# Patient Record
Sex: FEMALE | Race: WHITE | NOT HISPANIC OR LATINO | URBAN - NONMETROPOLITAN AREA
[De-identification: names, ages, dates, MRNs, and addresses within clinical notes are randomized per-mention and may not be internally consistent; named-entity substitution may affect disease eponyms.]

---

## 2023-05-01 ENCOUNTER — OFFICE (OUTPATIENT)
Dept: URBAN - NONMETROPOLITAN AREA CLINIC 1 | Facility: CLINIC | Age: 43
End: 2023-05-01

## 2023-05-01 VITALS
HEART RATE: 81 BPM | DIASTOLIC BLOOD PRESSURE: 81 MMHG | WEIGHT: 270 LBS | HEIGHT: 69 IN | SYSTOLIC BLOOD PRESSURE: 139 MMHG | SYSTOLIC BLOOD PRESSURE: 145 MMHG | DIASTOLIC BLOOD PRESSURE: 111 MMHG

## 2023-05-01 DIAGNOSIS — K31.84 GASTROPARESIS: ICD-10-CM

## 2023-05-01 DIAGNOSIS — R19.5 OTHER FECAL ABNORMALITIES: ICD-10-CM

## 2023-05-01 DIAGNOSIS — R10.11 RIGHT UPPER QUADRANT PAIN: ICD-10-CM

## 2023-05-01 DIAGNOSIS — K62.5 HEMORRHAGE OF ANUS AND RECTUM: ICD-10-CM

## 2023-05-01 DIAGNOSIS — K64.9 UNSPECIFIED HEMORRHOIDS: ICD-10-CM

## 2023-05-01 PROCEDURE — 99214 OFFICE O/P EST MOD 30 MIN: CPT | Performed by: NURSE PRACTITIONER

## 2023-05-01 RX ORDER — HYDROCORTISONE ACETATE 25 MG/1
50 SUPPOSITORY RECTAL
Qty: 30 | Refills: 1 | Status: ACTIVE
Start: 2023-05-01

## 2023-05-02 LAB
ANEMIA PROFILE A: BASO (ABSOLUTE): 0 X10E3/UL (ref 0–0.2)
ANEMIA PROFILE A: BASOS: 0 %
ANEMIA PROFILE A: EOS (ABSOLUTE): 0.2 X10E3/UL (ref 0–0.4)
ANEMIA PROFILE A: EOS: 2 %
ANEMIA PROFILE A: HEMATOCRIT: 39.7 % (ref 34–46.6)
ANEMIA PROFILE A: HEMATOLOGY COMMENTS: (no result)
ANEMIA PROFILE A: HEMOGLOBIN: 13.2 G/DL (ref 11.1–15.9)
ANEMIA PROFILE A: IMMATURE CELLS: (no result)
ANEMIA PROFILE A: IMMATURE GRANS (ABS): 0 X10E3/UL (ref 0–0.1)
ANEMIA PROFILE A: IMMATURE GRANULOCYTES: 0 %
ANEMIA PROFILE A: IRON BIND.CAP.(TIBC): 340 UG/DL (ref 250–450)
ANEMIA PROFILE A: IRON SATURATION: 9 % — CRITICAL LOW (ref 15–55)
ANEMIA PROFILE A: IRON: 31 UG/DL (ref 27–159)
ANEMIA PROFILE A: LYMPHS (ABSOLUTE): 3.2 X10E3/UL — HIGH (ref 0.7–3.1)
ANEMIA PROFILE A: LYMPHS: 27 %
ANEMIA PROFILE A: MCH: 28.1 PG (ref 26.6–33)
ANEMIA PROFILE A: MCHC: 33.2 G/DL (ref 31.5–35.7)
ANEMIA PROFILE A: MCV: 85 FL (ref 79–97)
ANEMIA PROFILE A: MONOCYTES(ABSOLUTE): 0.7 X10E3/UL (ref 0.1–0.9)
ANEMIA PROFILE A: MONOCYTES: 6 %
ANEMIA PROFILE A: NEUTROPHILS (ABSOLUTE): 7.8 X10E3/UL — HIGH (ref 1.4–7)
ANEMIA PROFILE A: NEUTROPHILS: 65 %
ANEMIA PROFILE A: NRBC: (no result)
ANEMIA PROFILE A: PLATELETS: 296 X10E3/UL (ref 150–450)
ANEMIA PROFILE A: RBC: 4.69 X10E6/UL (ref 3.77–5.28)
ANEMIA PROFILE A: RDW: 13.1 % (ref 11.7–15.4)
ANEMIA PROFILE A: RETICULOCYTE COUNT: 2.5 % (ref 0.6–2.6)
ANEMIA PROFILE A: UIBC: 309 UG/DL (ref 131–425)
ANEMIA PROFILE A: WBC: 12 X10E3/UL — HIGH (ref 3.4–10.8)
C-REACTIVE PROTEIN, QUANT: 8 MG/L (ref 0–10)
COMP. METABOLIC PANEL (14): A/G RATIO: 2 (ref 1.2–2.2)
COMP. METABOLIC PANEL (14): ALBUMIN: 4.3 G/DL (ref 3.8–4.8)
COMP. METABOLIC PANEL (14): ALKALINE PHOSPHATASE: 85 IU/L (ref 44–121)
COMP. METABOLIC PANEL (14): ALT (SGPT): 17 IU/L (ref 0–32)
COMP. METABOLIC PANEL (14): AST (SGOT): 16 IU/L (ref 0–40)
COMP. METABOLIC PANEL (14): BILIRUBIN, TOTAL: 0.3 MG/DL (ref 0–1.2)
COMP. METABOLIC PANEL (14): BUN/CREATININE RATIO: 14 (ref 9–23)
COMP. METABOLIC PANEL (14): BUN: 9 MG/DL (ref 6–24)
COMP. METABOLIC PANEL (14): CALCIUM: 9.6 MG/DL (ref 8.7–10.2)
COMP. METABOLIC PANEL (14): CARBON DIOXIDE, TOTAL: 21 MMOL/L (ref 20–29)
COMP. METABOLIC PANEL (14): CHLORIDE: 104 MMOL/L (ref 96–106)
COMP. METABOLIC PANEL (14): CREATININE: 0.65 MG/DL (ref 0.57–1)
COMP. METABOLIC PANEL (14): EGFR: 111 ML/MIN/1.73 (ref 59–?)
COMP. METABOLIC PANEL (14): GLOBULIN, TOTAL: 2.1 G/DL (ref 1.5–4.5)
COMP. METABOLIC PANEL (14): GLUCOSE: 94 MG/DL (ref 70–99)
COMP. METABOLIC PANEL (14): POTASSIUM: 4 MMOL/L (ref 3.5–5.2)
COMP. METABOLIC PANEL (14): PROTEIN, TOTAL: 6.4 G/DL (ref 6–8.5)
COMP. METABOLIC PANEL (14): SODIUM: 140 MMOL/L (ref 134–144)
FERRITIN: 32 NG/ML (ref 15–150)

## 2023-07-05 ENCOUNTER — ON CAMPUS - OUTPATIENT (OUTPATIENT)
Dept: URBAN - NONMETROPOLITAN AREA HOSPITAL 34 | Facility: HOSPITAL | Age: 43
End: 2023-07-05

## 2023-07-05 DIAGNOSIS — D12.5 BENIGN NEOPLASM OF SIGMOID COLON: ICD-10-CM

## 2023-07-05 PROCEDURE — 45385 COLONOSCOPY W/LESION REMOVAL: CPT | Performed by: INTERNAL MEDICINE

## 2024-07-08 ENCOUNTER — OFFICE (OUTPATIENT)
Dept: URBAN - NONMETROPOLITAN AREA CLINIC 1 | Facility: CLINIC | Age: 44
End: 2024-07-08

## 2024-07-08 VITALS
DIASTOLIC BLOOD PRESSURE: 92 MMHG | SYSTOLIC BLOOD PRESSURE: 130 MMHG | WEIGHT: 225 LBS | HEIGHT: 69 IN | HEART RATE: 86 BPM

## 2024-07-08 DIAGNOSIS — K64.9 UNSPECIFIED HEMORRHOIDS: ICD-10-CM

## 2024-07-08 DIAGNOSIS — K59.00 CONSTIPATION, UNSPECIFIED: ICD-10-CM

## 2024-07-08 DIAGNOSIS — R11.0 NAUSEA: ICD-10-CM

## 2024-07-08 DIAGNOSIS — K31.84 GASTROPARESIS: ICD-10-CM

## 2024-07-08 PROCEDURE — 99213 OFFICE O/P EST LOW 20 MIN: CPT | Performed by: NURSE PRACTITIONER

## 2024-07-08 RX ORDER — METOCLOPRAMIDE 10 MG/1
20 TABLET ORAL
Qty: 60 | Refills: 2 | Status: ACTIVE
Start: 2024-07-08

## 2024-07-08 RX ORDER — ONDANSETRON HYDROCHLORIDE 4 MG/1
TABLET, FILM COATED ORAL
Qty: 30 | Refills: 2 | Status: ACTIVE
Start: 2024-07-08

## 2024-07-08 RX ORDER — HYDROCORTISONE ACETATE 25 MG/1
50 SUPPOSITORY RECTAL
Qty: 30 | Refills: 1 | Status: ACTIVE
Start: 2023-05-01

## 2024-07-08 NOTE — SERVICENOTES
She is fully aware of the potential side effects with chronic Reglan usage.  She has not showing any signs of Parkinson's type symptoms.  We will refill her prescription today, and follow up again in 1 year.

## 2024-07-08 NOTE — SERVICEHPINOTES
She comes in today for follow-up of gastroparesis, Pearl's esophagus, GERD, and hemorrhoids.  She uses Zofran for nausea, and Reglan to promote motility.  She uses stool softeners and hydrocortisone suppositories for anal discomfort.  She does not usually have heartburn, uses famotidine 10 mg as needed.       Overall, she is doing well and does not have any new complaints today.  She has a significant GI history dating back to childhood. States that she acquired a parasite while swimming in 8th grade. She had continuous nausea, vomiting for 3 years prior to receiving a diagnosis [facility] [place]: pseudo-obstruction of the duodenum and gastroparesis. By her senior year in high school, she developed Pearl's esophagus due to constant retching/ gastroparesis. She had a Nissen fundoplication in 1996. A PEJ tube was placed temporarily for 3 years to bypass/ decompress her stomach from PO intake. Patient reports that at present she does not have a PEJ tube and that she has learned to manage her symptoms by eating a bland diet, and taking Reglan p.r.n. for gastric fullness/ bloating. 
br
br-EGD June 2020 did not show any evidence of Pearl's esophagus. Dr. Reeder recommended considering repeat in 5 years.br
br-Colonoscopy 7/5/23 by Dr. Reeder-
br   Rectal bleeding, mucus per rectum, history of hemorrhoids
brFindings: 3 millimeter sessile sigmoid polyp removed with cold snare technique and retrieved. Colon otherwise appeared normal with normal vascular pattern no evidence of significant diverticular disease. Normal distal terminal ileum. In the retroflex view patient did have notable hemorrhoids with the vast majority of the hemorrhoid tissue distal to the dentate line therefore no hemorrhoid banding completed.
br-Final Pathologic Diagnosis:br  COLON, SIGMOID, ENDOSCOPIC BIOPSY:br   Fragments of tubular adenoma.
br
Advise repeat in 7 years.